# Patient Record
Sex: MALE | Race: WHITE | URBAN - METROPOLITAN AREA
[De-identification: names, ages, dates, MRNs, and addresses within clinical notes are randomized per-mention and may not be internally consistent; named-entity substitution may affect disease eponyms.]

---

## 2017-01-03 ENCOUNTER — OFFICE VISIT (OUTPATIENT)
Dept: DERMATOLOGY | Age: 62
End: 2017-01-03

## 2017-01-03 DIAGNOSIS — D48.5 NEOPLASM OF UNCERTAIN BEHAVIOR OF SKIN: Primary | ICD-10-CM

## 2017-01-03 DIAGNOSIS — L71.9 ROSACEA: ICD-10-CM

## 2017-01-03 DIAGNOSIS — L57.0 AK (ACTINIC KERATOSIS): ICD-10-CM

## 2017-01-03 DIAGNOSIS — Z85.828 HISTORY OF BASAL CELL CANCER: ICD-10-CM

## 2017-01-03 DIAGNOSIS — D22.9 BENIGN NEVUS: ICD-10-CM

## 2017-01-03 DIAGNOSIS — L57.8 DIFFUSE PHOTODAMAGE OF SKIN: ICD-10-CM

## 2017-01-03 PROCEDURE — 99213 OFFICE O/P EST LOW 20 MIN: CPT | Performed by: DERMATOLOGY

## 2017-01-03 PROCEDURE — 17000 DESTRUCT PREMALG LESION: CPT | Performed by: DERMATOLOGY

## 2017-01-03 PROCEDURE — 17003 DESTRUCT PREMALG LES 2-14: CPT | Performed by: DERMATOLOGY

## 2017-01-03 RX ORDER — FLUOROURACIL 50 MG/G
CREAM TOPICAL
Qty: 40 G | Refills: 1 | Status: SHIPPED | OUTPATIENT
Start: 2017-01-03 | End: 2018-08-28 | Stop reason: SDUPTHER

## 2017-01-16 ENCOUNTER — TELEPHONE (OUTPATIENT)
Dept: DERMATOLOGY | Age: 62
End: 2017-01-16

## 2018-01-02 ENCOUNTER — OFFICE VISIT (OUTPATIENT)
Dept: DERMATOLOGY | Age: 63
End: 2018-01-02

## 2018-01-02 DIAGNOSIS — L82.1 SEBORRHEIC KERATOSES: ICD-10-CM

## 2018-01-02 DIAGNOSIS — D22.9 BENIGN NEVUS: ICD-10-CM

## 2018-01-02 DIAGNOSIS — L57.0 AK (ACTINIC KERATOSIS): ICD-10-CM

## 2018-01-02 DIAGNOSIS — D48.5 NEOPLASM OF UNCERTAIN BEHAVIOR OF SKIN: Primary | ICD-10-CM

## 2018-01-02 DIAGNOSIS — Z85.828 HISTORY OF BASAL CELL CANCER: ICD-10-CM

## 2018-01-02 PROCEDURE — 11101 PR BIOPSY, EACH ADDED LESION: CPT | Performed by: DERMATOLOGY

## 2018-01-02 PROCEDURE — 17000 DESTRUCT PREMALG LESION: CPT | Performed by: DERMATOLOGY

## 2018-01-02 PROCEDURE — 99214 OFFICE O/P EST MOD 30 MIN: CPT | Performed by: DERMATOLOGY

## 2018-01-02 PROCEDURE — 11100 PR BIOPSY OF SKIN LESION: CPT | Performed by: DERMATOLOGY

## 2018-01-02 PROCEDURE — 17003 DESTRUCT PREMALG LES 2-14: CPT | Performed by: DERMATOLOGY

## 2018-01-02 NOTE — PROGRESS NOTES
[facility]) Dermatology  Axel Corado M.D.  [phone]       Peterson Quiles  1955    63 y.o. male     Date of Visit: 1/2/2018    Chief Complaint:   Chief Complaint   Patient presents with    Follow-up     Yearly skin exam (upper body only per patient), no new concerns. Hx NMSC,AK's    Actinic Keratosis     Patient did not complete Efudex therapy, he would like areas rechecked first        I was asked to see this patient by Dr. Garcia ref. provider found. History of Present Illness:  1. Total body skin exam    Multiple actinic keratoses and hypertrophic actinic keratoses dorsal hands and forearms. Patient has not yet completed Efudex. New raised scaly papule left mid scalp. Dry. Pruritic. Patient is scratching at lesion. Multiple nevi. Stable in size, shape, color. Asymptomatic. Patient is wearing hats and sunscreen  Skin history:  2/14, 1/16 Efudex to scalp, arms, lips treated sequentially  3/15 right mid paraspinal back superficial basal cell carcinoma status post curettage  Rosacea-clindamycin per primary care doctor    Review of Systems:  Constitutional: Reports general sense of well-being       Past Medical History, Surgical History, Family History, Medications and Allergies reviewed. Social History:   Social History     Social History    Marital status:      Spouse name: N/A    Number of children: N/A    Years of education: N/A     Occupational History    Not on file. Social History Main Topics    Smoking status: Never Smoker    Smokeless tobacco: Never Used    Alcohol use No    Drug use: Unknown    Sexual activity: Not on file     Other Topics Concern    Not on file     Social History Narrative    No narrative on file       Physical Examination       -General: Well-appearing, NAD  Normal affect.   Total body skin exam including scalp, face, neck, chest, abdomen, back, bilateral upper extremities, bilateral lower extremities, ocular conjunctiva, external lips, and nails was performed. Underwear area not examined. Scattered on the trunk and extremities are multiple well-defined round and oval symmetric smoothly-bordered uniformly brown macules and papules. Multiple gritty erythematous papules over his scalp, forehead, dorsal hands, dorsal forearms  Multiple hyperkeratotic stuck on papules and plaques over his torso  Well-healed scar at site of prior basal cell carcinoma no sign of recurrence            6 mm well-demarcated dark brown papule left antecubital fossa, rule out atypia  Left mid scalp 1.2 cm keratotic plaque rule out squamous cell carcinoma      Assessment and Plan     1. Neoplasm of uncertain behavior of skin -Left antecubital fossa and left mid scalp--Discussed possible diagnosis. Patient agreeable to biopsy. Verbal consent obtained after risks (infection, bleeding, scar), benefits and alternatives explained. -Area(s) to be biopsied were marked with a surgical pen. Site(s) were cleansed with alcohol. Local anesthesia achieved with 1% lidocaine with epinephrine/sodium bicarbonate. Shave biopsy performed with a razor blade. Hemostasis was achieved with aluminum chloride. The wound(s) were dressed with petrolatum and covered with a bandage. Specimen(s) sent to pathology. Pt educated re: risk of bleeding, infection, scar and wound care instructions. 2. AK (actinic keratosis) - Scalp, dorsal forearms, dorsal hands-9 lesion(s) treated w/ liquid nitrogen. Edu re: risk of blister formation, discomfort, scar, hypopigmentation. Discussed wound care. Efudex 5% cream b.i.d. ×2.5 weeks dorsal hands and forearms. Reviewed proper use and side effects    3.  Benign nevus- Benign acquired melanocytic nevi  -Recommend monthly self skin exams   -Educated regarding the ABCDEs of melanoma detection   -Call for any new/changing moles or concerning lesions  -Reviewed sun protective behavior -- sun avoidance during the peak hours of the day, sun-protective clothing (including hat and sunglasses), sunscreen use (water resistant, broad spectrum, SPF at least 30, need for reapplication every 2 to 3 hours), avoidance of tanning beds       4. Seborrheic keratoses -Monitor for change    5. History of basal cell cancer - No evidence of recurrence. Discussed risk of subsequent skin cancers and increased risk of melanoma. Reviewed importance of monitoring skin for change and sun protection with hats and sunscreen, as well as sun avoidance.

## 2018-01-10 ENCOUNTER — TELEPHONE (OUTPATIENT)
Dept: DERMATOLOGY | Age: 63
End: 2018-01-10

## 2018-01-10 DIAGNOSIS — C44.42 SQUAMOUS CELL CARCINOMA OF SCALP: Primary | ICD-10-CM

## 2018-01-10 NOTE — TELEPHONE ENCOUNTER
Spoke to pt. Reviewed path results. Referral placed for Dr. Francisca Grossman. Pt stated he understood and will call Dr. Saida Gunderson office to schedule.

## 2018-01-11 ENCOUNTER — TELEPHONE (OUTPATIENT)
Dept: DERMATOLOGY | Age: 63
End: 2018-01-11

## 2018-08-28 ENCOUNTER — OFFICE VISIT (OUTPATIENT)
Dept: DERMATOLOGY | Age: 63
End: 2018-08-28

## 2018-08-28 DIAGNOSIS — Z85.828 HISTORY OF NONMELANOMA SKIN CANCER: ICD-10-CM

## 2018-08-28 DIAGNOSIS — L57.8 DIFFUSE PHOTODAMAGE OF SKIN: ICD-10-CM

## 2018-08-28 DIAGNOSIS — L57.0 AK (ACTINIC KERATOSIS): Primary | ICD-10-CM

## 2018-08-28 DIAGNOSIS — L82.1 SEBORRHEIC KERATOSES: ICD-10-CM

## 2018-08-28 DIAGNOSIS — D22.9 BENIGN NEVUS: ICD-10-CM

## 2018-08-28 PROCEDURE — 17003 DESTRUCT PREMALG LES 2-14: CPT | Performed by: DERMATOLOGY

## 2018-08-28 PROCEDURE — 99213 OFFICE O/P EST LOW 20 MIN: CPT | Performed by: DERMATOLOGY

## 2018-08-28 PROCEDURE — 17000 DESTRUCT PREMALG LESION: CPT | Performed by: DERMATOLOGY

## 2018-08-28 RX ORDER — FLUOROURACIL 50 MG/G
CREAM TOPICAL
Qty: 40 G | Refills: 1 | Status: SHIPPED | OUTPATIENT
Start: 2018-08-28 | End: 2020-02-04 | Stop reason: ALTCHOICE

## 2018-08-28 NOTE — PROGRESS NOTES
recurrence  Multiple gritty erythematous papules including multiple hypertrophic papules and multiple excoriated prurigo nodules dorsal hands, forearms. Multiple hyperkeratotic stuck on papules and plaques over his torso. Prominent photodamage scalp, neck, dorsal hands, dorsal forearms. Assessment and Plan     1. AK (actinic keratosis) - 8-dorsal hands, forearms, scalp lesion(s) treated w/ liquid nitrogen. Edu re: risk of blister formation, discomfort, scar, hypopigmentation. Discussed wound care. Diffuse background actinic change-Efudex 5% cream b.i.d. ×3 weeks dorsal hands, forearms. After he has healed, Efudex b.i.d. ×2 weeks scalp. Reviewed side effects, contraindications, proper application. Discussed importance of sun avoidance. 2. Benign nevus - Benign acquired melanocytic nevi  -Recommend monthly self skin exams   -Educated regarding the ABCDEs of melanoma detection   -Call for any new/changing moles or concerning lesions  -Reviewed sun protective behavior -- sun avoidance during the peak hours of the day, sun-protective clothing (including hat and sunglasses), sunscreen use (water resistant, broad spectrum, SPF at least 30, need for reapplication every 2 to 3 hours), avoidance of tanning beds      3. Seborrheic keratoses -Monitor for change    4. Diffuse photodamage of skin -Hats, sunscreen, sun avoidance    5. History of nonmelanoma skin cancer - No evidence of recurrence. Discussed risk of subsequent skin cancers and increased risk of melanoma. Reviewed importance of monitoring skin for change and sun protection with hats and sunscreen, as well as sun avoidance.        Follow-up 2/19

## 2018-08-29 ENCOUNTER — HOSPITAL ENCOUNTER (OUTPATIENT)
Age: 63
End: 2018-08-29
Payer: COMMERCIAL

## 2018-08-29 DIAGNOSIS — R42: Primary | ICD-10-CM

## 2018-08-29 PROCEDURE — 93880 EXTRACRANIAL BILAT STUDY: CPT

## 2019-01-15 ENCOUNTER — OFFICE VISIT (OUTPATIENT)
Dept: DERMATOLOGY | Age: 64
End: 2019-01-15
Payer: COMMERCIAL

## 2019-01-15 DIAGNOSIS — D22.9 BENIGN NEVUS: ICD-10-CM

## 2019-01-15 DIAGNOSIS — Z85.828 HISTORY OF NONMELANOMA SKIN CANCER: ICD-10-CM

## 2019-01-15 DIAGNOSIS — L57.0 AK (ACTINIC KERATOSIS): Primary | ICD-10-CM

## 2019-01-15 PROCEDURE — 17000 DESTRUCT PREMALG LESION: CPT | Performed by: DERMATOLOGY

## 2019-01-15 PROCEDURE — 17003 DESTRUCT PREMALG LES 2-14: CPT | Performed by: DERMATOLOGY

## 2019-01-15 PROCEDURE — 99213 OFFICE O/P EST LOW 20 MIN: CPT | Performed by: DERMATOLOGY

## 2020-02-04 ENCOUNTER — OFFICE VISIT (OUTPATIENT)
Dept: DERMATOLOGY | Age: 65
End: 2020-02-04
Payer: COMMERCIAL

## 2020-02-04 PROCEDURE — 99213 OFFICE O/P EST LOW 20 MIN: CPT | Performed by: DERMATOLOGY

## 2020-02-04 PROCEDURE — 17000 DESTRUCT PREMALG LESION: CPT | Performed by: DERMATOLOGY

## 2020-02-04 RX ORDER — FLUOROURACIL 50 MG/G
CREAM TOPICAL
Qty: 40 G | Refills: 1 | Status: SHIPPED | OUTPATIENT
Start: 2020-02-04 | End: 2020-07-28

## 2020-02-04 NOTE — PROGRESS NOTES
Inability: Not on file    Transportation needs:     Medical: Not on file     Non-medical: Not on file   Tobacco Use    Smoking status: Never Smoker    Smokeless tobacco: Never Used   Substance and Sexual Activity    Alcohol use: No    Drug use: Not on file    Sexual activity: Not on file   Lifestyle    Physical activity:     Days per week: Not on file     Minutes per session: Not on file    Stress: Not on file   Relationships    Social connections:     Talks on phone: Not on file     Gets together: Not on file     Attends Gnosticist service: Not on file     Active member of club or organization: Not on file     Attends meetings of clubs or organizations: Not on file     Relationship status: Not on file    Intimate partner violence:     Fear of current or ex partner: Not on file     Emotionally abused: Not on file     Physically abused: Not on file     Forced sexual activity: Not on file   Other Topics Concern    Not on file   Social History Narrative    Not on file       Physical Examination       -General: Well-appearing, NAD  1. Normal affect. Total body skin exam including scalp, face, neck, chest, abdomen, back, bilateral upper extremities, bilateral lower extremities-anterior lower legs between knees and socks only, ocular conjunctiva, external lips, and nails was performed. Examination normal unless stated below. Underwear area not examined. Scattered on the trunk and extremities are multiple well-defined round and oval symmetric smoothly-bordered uniformly brown macules and papules. Multiple gritty erythematous papules scalp, face including forehead, temples, cheeks, dorsal hands, dorsal forearms. Diffuse background photodamage with freckling and lentigines  Multiple well-healed scars no sign of recurrence      Assessment and Plan     1. AK (actinic keratosis) -right lower eyelid-1 lesion(s) treated w/ liquid nitrogen. Edu re: risk of blister formation, discomfort, scar, hypopigmentation.

## 2020-07-28 ENCOUNTER — OFFICE VISIT (OUTPATIENT)
Dept: DERMATOLOGY | Age: 65
End: 2020-07-28
Payer: COMMERCIAL

## 2020-07-28 VITALS — TEMPERATURE: 98 F

## 2020-07-28 PROCEDURE — 99213 OFFICE O/P EST LOW 20 MIN: CPT | Performed by: DERMATOLOGY

## 2020-07-28 NOTE — PROGRESS NOTES
file   Tobacco Use    Smoking status: Never Smoker    Smokeless tobacco: Never Used   Substance and Sexual Activity    Alcohol use: No    Drug use: Not on file    Sexual activity: Not on file   Lifestyle    Physical activity     Days per week: Not on file     Minutes per session: Not on file    Stress: Not on file   Relationships    Social connections     Talks on phone: Not on file     Gets together: Not on file     Attends Shinto service: Not on file     Active member of club or organization: Not on file     Attends meetings of clubs or organizations: Not on file     Relationship status: Not on file    Intimate partner violence     Fear of current or ex partner: Not on file     Emotionally abused: Not on file     Physically abused: Not on file     Forced sexual activity: Not on file   Other Topics Concern    Not on file   Social History Narrative    Not on file       Physical Examination       -General: Well-appearing, NAD  1. Normal affect. Total body skin exam including scalp, face, neck, chest, abdomen, back, bilateral upper extremities, bilateral lower extremities between knees and socks, ocular conjunctiva, external lips, and nails was performed. Examination normal unless stated below. Underwear area not examined. Scattered on the trunk and extremities are multiple well-defined round and oval symmetric smoothly-bordered uniformly brown macules and papules. Multiple gritty erythematous papules and plaques scalp, lateral face, posterior neck, ears, dorsal hands and forearms  Minimal seborrheic dermatitis today    Assessment and Plan     1. AK (actinic keratosis)-Efudex 5% cream twice daily for 2 weeks scalp, lateral face, posterior neck, ears, dorsal hands and forearms. Patient will wait until he is wearing long sleeves to treat. Reviewed proper application and side effects-discussed strict sun avoidance    States that he has enough medication at home to treat   2.  Benign nevus - Benign acquired melanocytic nevi  -Recommend monthly self skin exams   -Educated regarding the ABCDEs of melanoma detection   -Call for any new/changing moles or concerning lesions  -Reviewed sun protective behavior -- sun avoidance during the peak hours of the day, sun-protective clothing (including hat and sunglasses), sunscreen use (water resistant, broad spectrum, SPF at least 30, need for reapplication every 2 to 3 hours), avoidance of tanning beds      3. Seborrheic dermatitis-patient will let me know when he needs a refill and call with name of shampoo   4. History of nonmelanoma skin cancer - No evidence of recurrence. Discussed risk of subsequent skin cancers and increased risk of melanoma. Reviewed importance of monitoring skin for change and sun protection with hats and sunscreen, as well as sun avoidance.

## 2020-11-17 ENCOUNTER — HOSPITAL ENCOUNTER (OUTPATIENT)
Age: 65
End: 2020-11-17
Payer: COMMERCIAL

## 2020-11-17 DIAGNOSIS — U07.1: ICD-10-CM

## 2020-11-17 DIAGNOSIS — Z20.828: Primary | ICD-10-CM

## 2020-11-17 PROCEDURE — U0003 INFECTIOUS AGENT DETECTION BY NUCLEIC ACID (DNA OR RNA); SEVERE ACUTE RESPIRATORY SYNDROME CORONAVIRUS 2 (SARS-COV-2) (CORONAVIRUS DISEASE [COVID-19]), AMPLIFIED PROBE TECHNIQUE, MAKING USE OF HIGH THROUGHPUT TECHNOLOGIES AS DESCRIBED BY CMS-2020-01-R: HCPCS

## 2021-01-12 ENCOUNTER — OFFICE VISIT (OUTPATIENT)
Dept: DERMATOLOGY | Age: 66
End: 2021-01-12
Payer: COMMERCIAL

## 2021-01-12 VITALS — TEMPERATURE: 97.7 F

## 2021-01-12 DIAGNOSIS — L57.0 KERATOSIS, ACTINIC: Primary | ICD-10-CM

## 2021-01-12 DIAGNOSIS — D22.9 BENIGN NEVUS: ICD-10-CM

## 2021-01-12 DIAGNOSIS — Z85.828 HISTORY OF NONMELANOMA SKIN CANCER: ICD-10-CM

## 2021-01-12 PROCEDURE — 99214 OFFICE O/P EST MOD 30 MIN: CPT | Performed by: DERMATOLOGY

## 2021-01-12 RX ORDER — FLUOROURACIL 50 MG/G
CREAM TOPICAL
Qty: 40 G | Refills: 1 | Status: SHIPPED | OUTPATIENT
Start: 2021-01-12

## 2021-01-12 NOTE — PROGRESS NOTES
[facility]) Dermatology  Stevo Lieberman M.D.  [phone]       Harvey Castro  1955    63 y.o. male     Date of Visit: 1/12/2021    Chief Complaint:   Chief Complaint   Patient presents with    Skin Lesion     upper body only        I was asked to see this patient by Dr. Garcia ref. provider found. History of Present Illness:  1. Waist up skin exam    Increasing number of actinic keratoses scalp, lateral face, dorsal hands and forearms. Multiple hypertrophic papules and plaques. Had initially planned for Efudex during Covid shutdown-then deferred until fall 2021 patient developed Covid. Patient would like to review treatment and plans to proceed with treatment in the next few weeks. Multiple nevi. Stable in size, shape, color. Has not noticed any new or changing pigmented lesions. Does monitor his skin for change. Skin history:  2/14, 1/16 Efudex to scalp, arms, lips treated sequentially     3/15 right mid paraspinal back superficial basal cell carcinoma status post curettage  1/18 left mid scalp squamous cell carcinoma status post Mohs  1/18 Efudex cream dorsal hands, forearms including biopsy-proven pigmented actinic keratosis left antecubital fossa  12/18 Efudex dorsal hands, forearms  1/19 Efudex face/ scalp     Rosacea-clindamycin per primary care doctor  Review of Systems:  Constitutional: Reports general sense of well-being       Past Medical History, Surgical History, Family History, Medications and Allergies reviewed.     Social History:   Social History     Socioeconomic History    Marital status:      Spouse name: Not on file    Number of children: Not on file    Years of education: Not on file    Highest education level: Not on file   Occupational History    Not on file   Social Needs    Financial resource strain: Not on file    Food insecurity     Worry: Not on file     Inability: Not on file    Transportation needs     Medical: Not on file     Non-medical: Not on file Tobacco Use    Smoking status: Never Smoker    Smokeless tobacco: Never Used   Substance and Sexual Activity    Alcohol use: No    Drug use: Not on file    Sexual activity: Not on file   Lifestyle    Physical activity     Days per week: Not on file     Minutes per session: Not on file    Stress: Not on file   Relationships    Social connections     Talks on phone: Not on file     Gets together: Not on file     Attends Protestant service: Not on file     Active member of club or organization: Not on file     Attends meetings of clubs or organizations: Not on file     Relationship status: Not on file    Intimate partner violence     Fear of current or ex partner: Not on file     Emotionally abused: Not on file     Physically abused: Not on file     Forced sexual activity: Not on file   Other Topics Concern    Not on file   Social History Narrative    Not on file       Physical Examination       -General: Well-appearing, NAD  1. Normal affect. Total body skin exam including scalp, face, neck, chest, abdomen, back, bilateral upper extremities, bilateral lower extremities, ocular conjunctiva, external lips, and nails was performed. Examination normal unless stated below. Underwear area not examined. Scattered on the trunk and extremities are multiple well-defined round and oval symmetric smoothly-bordered uniformly brown macules and papules. Widespread gritty erythematous papules and plaques scalp, forehead, temples, cheeks, upper neck, dorsal hands, dorsal forearms  Well-healed scars no sign of recurrence      Assessment and Plan     1. Keratosis, actinic Efudex 5% cream twice daily for 2 weeks scalp, forehead, temples, cheeks, upper neck, top of ears. After completing 2 weeks-Efudex 5% cream twice daily for 2.5 weeks to dorsal hands, forearms and 3 weeks to more hypertrophic papules and plaques. Reviewed side effects, contraindications, proper application.   Discussed strict sun avoidance-patient already aware   2. Benign nevus - Benign acquired melanocytic nevi  -Recommend monthly self skin exams   -Educated regarding the ABCDEs of melanoma detection   -Call for any new/changing moles or concerning lesions  -Reviewed sun protective behavior -- sun avoidance during the peak hours of the day, sun-protective clothing (including hat and sunglasses), sunscreen use (water resistant, broad spectrum, SPF at least 30, need for reapplication every 2 to 3 hours), avoidance of tanning beds      3. History of nonmelanoma skin cancer - No evidence of recurrence. Discussed risk of subsequent skin cancers and increased risk of melanoma. Reviewed importance of monitoring skin for change and sun protection with hats and sunscreen, as well as sun avoidance.        Recheck 5 to 6 weeks

## 2021-02-25 ENCOUNTER — OFFICE VISIT (OUTPATIENT)
Dept: DERMATOLOGY | Age: 66
End: 2021-02-25
Payer: COMMERCIAL

## 2021-02-25 VITALS — TEMPERATURE: 97.3 F

## 2021-02-25 DIAGNOSIS — L57.0 AK (ACTINIC KERATOSIS): ICD-10-CM

## 2021-02-25 DIAGNOSIS — L57.0 ACTINIC KERATOSIS TREATED WITH TOPICAL FLUOROURACIL (5FU): Primary | ICD-10-CM

## 2021-02-25 PROCEDURE — 17003 DESTRUCT PREMALG LES 2-14: CPT | Performed by: DERMATOLOGY

## 2021-02-25 PROCEDURE — 17000 DESTRUCT PREMALG LESION: CPT | Performed by: DERMATOLOGY

## 2021-02-25 PROCEDURE — 99214 OFFICE O/P EST MOD 30 MIN: CPT | Performed by: DERMATOLOGY

## 2021-02-25 NOTE — PROGRESS NOTES
[facility]) Dermatology  Sierra Jordan M.D.  [phone]       Aleshia Ragland  1955    63 y.o. male     Date of Visit: 2/25/2021    Chief Complaint:   Chief Complaint   Patient presents with    Skin Lesion     Efudex f/u        I was asked to see this patient by Dr. Garcia ref. provider found. History of Present Illness:  1. Patient presents today for follow-up after Efudex    Use Efudex 5% cream twice daily for 2 weeks to his scalp, lateral face, ears and twice daily for 2.5 weeks to his dorsal hands and forearms. A few more hypertrophic papules improved but did not fully resolve. Did have superficial desquamation and resolution of back round actinic damage. No difficulty with infection or sunburn    Skin history:  2/14, 1/16 Efudex to scalp, arms, lips treated sequentially     3/15 right mid paraspinal back superficial basal cell carcinoma status post curettage  1/18 left mid scalp squamous cell carcinoma status post Mohs  1/18 Efudex cream dorsal hands, forearms including biopsy-proven pigmented actinic keratosis left antecubital fossa  12/18 Efudex dorsal hands, forearms  1/19 Efudex face/ scalp    Review of Systems:  Constitutional: Reports general sense of well-being       Past Medical History, Surgical History, Family History, Medications and Allergies reviewed.     Social History:   Social History     Socioeconomic History    Marital status:      Spouse name: Not on file    Number of children: Not on file    Years of education: Not on file    Highest education level: Not on file   Occupational History    Not on file   Social Needs    Financial resource strain: Not on file    Food insecurity     Worry: Not on file     Inability: Not on file    Transportation needs     Medical: Not on file     Non-medical: Not on file   Tobacco Use    Smoking status: Never Smoker    Smokeless tobacco: Never Used   Substance and Sexual Activity    Alcohol use: No    Drug use: Not on file    Sexual activity: Not on file   Lifestyle    Physical activity     Days per week: Not on file     Minutes per session: Not on file    Stress: Not on file   Relationships    Social connections     Talks on phone: Not on file     Gets together: Not on file     Attends Roman Catholic service: Not on file     Active member of club or organization: Not on file     Attends meetings of clubs or organizations: Not on file     Relationship status: Not on file    Intimate partner violence     Fear of current or ex partner: Not on file     Emotionally abused: Not on file     Physically abused: Not on file     Forced sexual activity: Not on file   Other Topics Concern    Not on file   Social History Narrative    Not on file       Physical Examination       -General: Well-appearing, NAD  1. Well-developed well-nourished  Background mild erythema dorsal forearms, lateral face, scalp  More hypertrophic papules thinner and flatter but still present dorsal hands, forearms, ears      Assessment and Plan     1. Actinic keratosis treated with topical fluorouracil (5FU)-discussed hats, sunscreen, sun avoidance. Anticipate repeat courses of Efudex in the future-yearly. 2. AK (actinic keratosis) - 11-ears, dorsal hands, forearms lesion(s) treated w/ liquid nitrogen. Edu re: risk of blister formation, discomfort, scar, hypopigmentation. Discussed wound care.

## 2021-06-08 ENCOUNTER — TELEPHONE (OUTPATIENT)
Dept: DERMATOLOGY | Age: 66
End: 2021-06-08

## 2021-06-08 NOTE — TELEPHONE ENCOUNTER
Unable to return call to patient to get the name of the shampoo [phone] number states number has been changed to [phone], when that number was called message states \" call can't be completed as dialed\".

## 2021-07-13 ENCOUNTER — OFFICE VISIT (OUTPATIENT)
Dept: DERMATOLOGY | Age: 66
End: 2021-07-13
Payer: COMMERCIAL

## 2021-07-13 VITALS — TEMPERATURE: 98.6 F

## 2021-07-13 DIAGNOSIS — L21.9 SEBORRHEIC DERMATITIS: ICD-10-CM

## 2021-07-13 DIAGNOSIS — D22.9 BENIGN NEVUS: ICD-10-CM

## 2021-07-13 DIAGNOSIS — L57.0 ACTINIC KERATOSIS TREATED WITH TOPICAL FLUOROURACIL (5FU): Primary | ICD-10-CM

## 2021-07-13 DIAGNOSIS — D48.5 NEOPLASM OF UNCERTAIN BEHAVIOR OF SKIN: ICD-10-CM

## 2021-07-13 DIAGNOSIS — Z85.828 HISTORY OF NONMELANOMA SKIN CANCER: ICD-10-CM

## 2021-07-13 DIAGNOSIS — L57.0 AK (ACTINIC KERATOSIS): ICD-10-CM

## 2021-07-13 PROCEDURE — 11102 TANGNTL BX SKIN SINGLE LES: CPT | Performed by: DERMATOLOGY

## 2021-07-13 PROCEDURE — 17000 DESTRUCT PREMALG LESION: CPT | Performed by: DERMATOLOGY

## 2021-07-13 PROCEDURE — 99214 OFFICE O/P EST MOD 30 MIN: CPT | Performed by: DERMATOLOGY

## 2021-07-13 PROCEDURE — 17003 DESTRUCT PREMALG LES 2-14: CPT | Performed by: DERMATOLOGY

## 2021-07-13 RX ORDER — KETOCONAZOLE 20 MG/ML
SHAMPOO TOPICAL
Qty: 120 ML | Refills: 6 | Status: SHIPPED | OUTPATIENT
Start: 2021-07-13

## 2021-07-13 NOTE — PROGRESS NOTES
Smoker    Smokeless tobacco: Never Used   Vaping Use    Vaping Use: Never used   Substance and Sexual Activity    Alcohol use: No    Drug use: Not on file    Sexual activity: Not on file   Other Topics Concern    Not on file   Social History Narrative    Not on file     Social Determinants of Health     Financial Resource Strain:     Difficulty of Paying Living Expenses:    Food Insecurity:     Worried About Running Out of Food in the Last Year:     [address] in the Last Year:    Transportation Needs:     Lack of Transportation (Medical):  Lack of Transportation (Non-Medical):    Physical Activity:     Days of Exercise per Week:     Minutes of Exercise per Session:    Stress:     Feeling of Stress :    Social Connections:     Frequency of Communication with Friends and Family:     Frequency of Social Gatherings with Friends and Family:     Attends Islam Services:     Active Member of Clubs or Organizations:     Attends Club or Organization Meetings:     Marital Status:    Intimate Partner Violence:     Fear of Current or Ex-Partner:     Emotionally Abused:     Physically Abused:     Sexually Abused:        Physical Examination       -General: Well-appearing, NAD  Normal affect. Total body skin exam including scalp, face, neck, chest, abdomen, back, bilateral upper extremities, bilateral lower extremities, ocular conjunctiva, external lips, and nails was performed. Examination normal unless stated below. Underwear area not examined. Scattered on the trunk and extremities are multiple well-defined round and oval symmetric smoothly-bordered uniformly brown macules and papules. Background gritty erythematous papules forehead, preauricular cheeks, scalp, dorsal forearms.   More discrete hypertrophic papules dorsal forearms  Mild background scale throughout his scalp, eyebrows, nasofacial groove  Well-healed scars no sign of recurrence    Left medial supra brow 1.1 cm erythematous plaque-rule out superficial basal cell carcinoma      Assessment and Plan     1. .  Actinic keratosis treated with Efudex-Efudex 5% cream twice daily for 2 weeks scalp, forehead, temples, cheeks, ears, upper neck, dorsal hands and forearms. Reviewed side effects, contraindications, proper application. Patient will treat this winter when he can be completely out of the sun   2. AK (actinic keratosis) -7 dorsal forearms- lesion(s) treated w/ liquid nitrogen. Edu re: risk of blister formation, discomfort, scar, hypopigmentation. Discussed wound care. 3. Neoplasm of uncertain behavior of skin-left medial supra brow--Discussed possible diagnosis. Patient agreeable to biopsy. Verbal consent obtained after risks (infection, bleeding, scar), benefits and alternatives explained. -Area(s) to be biopsied were marked with a surgical pen. Site(s) were cleansed with alcohol. Local anesthesia achieved with 1% lidocaine with epinephrine/sodium bicarbonate. Shave biopsy performed with a razor blade. Hemostasis was achieved with aluminum chloride. The wound(s) were dressed with petrolatum and covered with a bandage. Specimen(s) sent to pathology. Pt educated re: risk of bleeding, infection, scar and wound care instructions. 4. Benign nevus - Benign acquired melanocytic nevi  -Recommend monthly self skin exams   -Educated regarding the ABCDEs of melanoma detection   -Call for any new/changing moles or concerning lesions  -Reviewed sun protective behavior -- sun avoidance during the peak hours of the day, sun-protective clothing (including hat and sunglasses), sunscreen use (water resistant, broad spectrum, SPF at least 30, need for reapplication every 2 to 3 hours), avoidance of tanning beds      5. Seborrheic dermatitis-ketoconazole 2% shampoo-discussed proper use, discussed washing both his face and scalp. 6. History of nonmelanoma skin cancer - No evidence of recurrence.  Discussed risk of subsequent skin cancers and

## 2021-07-15 LAB — DERMATOLOGY PATHOLOGY REPORT: NORMAL

## 2022-04-30 ENCOUNTER — HOSPITAL ENCOUNTER (EMERGENCY)
Age: 67
Discharge: HOME | End: 2022-04-30
Payer: COMMERCIAL

## 2022-04-30 VITALS
TEMPERATURE: 98.7 F | SYSTOLIC BLOOD PRESSURE: 141 MMHG | RESPIRATION RATE: 19 BRPM | DIASTOLIC BLOOD PRESSURE: 87 MMHG | OXYGEN SATURATION: 98 % | HEART RATE: 67 BPM

## 2022-04-30 VITALS
RESPIRATION RATE: 19 BRPM | OXYGEN SATURATION: 98 % | TEMPERATURE: 98.78 F | SYSTOLIC BLOOD PRESSURE: 141 MMHG | DIASTOLIC BLOOD PRESSURE: 87 MMHG | HEART RATE: 67 BPM

## 2022-04-30 VITALS — BODY MASS INDEX: 31.3 KG/M2

## 2022-04-30 DIAGNOSIS — I10: ICD-10-CM

## 2022-04-30 DIAGNOSIS — Z96.643: ICD-10-CM

## 2022-04-30 DIAGNOSIS — Z88.8: ICD-10-CM

## 2022-04-30 DIAGNOSIS — G89.29: ICD-10-CM

## 2022-04-30 DIAGNOSIS — I25.10: ICD-10-CM

## 2022-04-30 DIAGNOSIS — M62.838: ICD-10-CM

## 2022-04-30 DIAGNOSIS — Z79.899: ICD-10-CM

## 2022-04-30 DIAGNOSIS — M54.50: Primary | ICD-10-CM

## 2022-04-30 DIAGNOSIS — E11.9: ICD-10-CM

## 2022-04-30 DIAGNOSIS — Z95.5: ICD-10-CM

## 2022-04-30 DIAGNOSIS — Z79.51: ICD-10-CM

## 2022-04-30 DIAGNOSIS — Z79.84: ICD-10-CM

## 2022-04-30 DIAGNOSIS — E78.5: ICD-10-CM

## 2022-04-30 PROCEDURE — G0463 HOSPITAL OUTPT CLINIC VISIT: HCPCS

## 2022-04-30 PROCEDURE — 99213 OFFICE O/P EST LOW 20 MIN: CPT

## 2022-04-30 PROCEDURE — 96372 THER/PROPH/DIAG INJ SC/IM: CPT

## 2023-03-15 ENCOUNTER — OFFICE VISIT (OUTPATIENT)
Dept: DERMATOLOGY | Age: 68
End: 2023-03-15
Payer: MEDICARE

## 2023-03-15 DIAGNOSIS — D22.9 BENIGN NEVUS: ICD-10-CM

## 2023-03-15 DIAGNOSIS — Z85.828 HISTORY OF NONMELANOMA SKIN CANCER: ICD-10-CM

## 2023-03-15 DIAGNOSIS — D48.5 NEOPLASM OF UNCERTAIN BEHAVIOR OF SKIN: ICD-10-CM

## 2023-03-15 DIAGNOSIS — L57.0 AK (ACTINIC KERATOSIS): Primary | ICD-10-CM

## 2023-03-15 DIAGNOSIS — L21.9 SEBORRHEIC DERMATITIS: ICD-10-CM

## 2023-03-15 DIAGNOSIS — L57.0 ACTINIC KERATOSIS TREATED WITH TOPICAL FLUOROURACIL (5FU): ICD-10-CM

## 2023-03-15 PROCEDURE — 99214 OFFICE O/P EST MOD 30 MIN: CPT | Performed by: DERMATOLOGY

## 2023-03-15 PROCEDURE — G8484 FLU IMMUNIZE NO ADMIN: HCPCS | Performed by: DERMATOLOGY

## 2023-03-15 PROCEDURE — 1036F TOBACCO NON-USER: CPT | Performed by: DERMATOLOGY

## 2023-03-15 PROCEDURE — 17000 DESTRUCT PREMALG LESION: CPT | Performed by: DERMATOLOGY

## 2023-03-15 PROCEDURE — 3017F COLORECTAL CA SCREEN DOC REV: CPT | Performed by: DERMATOLOGY

## 2023-03-15 PROCEDURE — 11102 TANGNTL BX SKIN SINGLE LES: CPT | Performed by: DERMATOLOGY

## 2023-03-15 PROCEDURE — G8421 BMI NOT CALCULATED: HCPCS | Performed by: DERMATOLOGY

## 2023-03-15 PROCEDURE — 1123F ACP DISCUSS/DSCN MKR DOCD: CPT | Performed by: DERMATOLOGY

## 2023-03-15 PROCEDURE — G8427 DOCREV CUR MEDS BY ELIG CLIN: HCPCS | Performed by: DERMATOLOGY

## 2023-03-15 PROCEDURE — 17003 DESTRUCT PREMALG LES 2-14: CPT | Performed by: DERMATOLOGY

## 2023-03-15 RX ORDER — FLUOROURACIL 50 MG/G
CREAM TOPICAL
Qty: 40 G | Refills: 1 | Status: SHIPPED | OUTPATIENT
Start: 2023-03-15

## 2023-03-15 RX ORDER — KETOCONAZOLE 20 MG/ML
SHAMPOO TOPICAL
Qty: 120 ML | Refills: 6 | OUTPATIENT
Start: 2023-03-15

## 2023-03-15 RX ORDER — KETOCONAZOLE 20 MG/ML
SHAMPOO TOPICAL
Qty: 120 ML | Refills: 6 | Status: SHIPPED | OUTPATIENT
Start: 2023-03-15

## 2023-03-15 NOTE — PROGRESS NOTES
[facility]) Dermatology  Darlene Toledo M.D.  [phone]       Kenny Wilson  1955    74 y.o. male     Date of Visit: 3/15/2023    Chief Complaint:   Chief Complaint   Patient presents with    Skin Lesion     Medication refill        I was asked to see this patient by Dr. Garcia ref. provider found. History of Present Illness:  1. Follow-up    Patient has not been seen in almost 2 years-2 shoulder replacements since his last visit    Actinic keratoses-did use Efudex 5% cream twice daily for 1 week face, dorsal forearms, back of his neck-then ran out. Still has a few actinic keratoses forearms, face, back of his neck. No lesion growing rapidly. Erythematous plaque right lateral upper arm. Present for years. Not increasing in size, itching or bleeding. Patient noticed that it developed after a flu shot. Seborrheic dermatitis-uses ketoconazole shampoo. Needs a refill. Does feel that this is helpful          Skin history:  2/14, 1/16 Efudex to scalp, arms, lips treated sequentially     3/15 right mid paraspinal back superficial basal cell carcinoma status post curettage  1/18 left mid scalp squamous cell carcinoma status post Mohs  1/18 Efudex cream dorsal hands, forearms including biopsy-proven pigmented actinic keratosis left antecubital fossa  12/18 Efudex dorsal hands, forearms  1/19 Efudex face/ scalp  2/21 Efudex scalp, lateral face, ears, dorsal hands and forearms  1/23 Efudex face, arms, back of his neck for 1 week-ran out of cream    Review of Systems:  Constitutional: Reports general sense of well-being       Past Medical History, Surgical History, Family History, Medications and Allergies reviewed.     Social History:   Social History     Socioeconomic History    Marital status:      Spouse name: Not on file    Number of children: Not on file    Years of education: Not on file    Highest education level: Not on file   Occupational History    Not on file   Tobacco Use    Smoking status: Never    Smokeless tobacco: Never   Vaping Use    Vaping Use: Never used   Substance and Sexual Activity    Alcohol use: No    Drug use: Not on file    Sexual activity: Not on file   Other Topics Concern    Not on file   Social History Narrative    Not on file     Social Determinants of Health     Financial Resource Strain: Not on file   Food Insecurity: Not on file   Transportation Needs: Not on file   Physical Activity: Not on file   Stress: Not on file   Social Connections: Not on file   Intimate Partner Violence: Not on file   Housing Stability: Not on file       Physical Examination       -General: Well-appearing, NAD  1. Waist up skin exam  Severe photodamage with freckling and lentigines face, neck, forearms  Multiple gritty erythematous papules scalp, face, arms, posterior neck  Mild scaling erythema throughout his scalp-seborrheic dermatitis    Right lateral upper arm 1.2 cm thin pink plaque-BL K, rule out actinic keratosis or basal cell carcinoma        Assessment and Plan     1. AK (actinic keratosis) -9-scalp, face, arms, neck after the risks, complications, and alternatives were explained to the patient, including risk of blister formation, discomfort, scar, hypopigmentation, patient elected to proceed with cryotherapy. Lesion(s) were treated w/ liquid nitrogen using a Cryogun. 1 freeze thaw cycle was performed with a freeze time of 1-5 seconds. There were no immediate complications. Wound care instructions were discussed with the patient. 2. Actinic keratosis treated with topical fluorouracil (5FU)-Efudex 5% cream twice daily for 1 more week posterior neck. Reviewed side effects, contraindications, proper application, strict sun avoidance   3. Neoplasm of uncertain behavior of skin-right lateral upper arm--Discussed possible diagnosis. Patient agreeable to biopsy. Verbal consent obtained after risks (infection, bleeding, scar), benefits and alternatives explained.    -Area(s) to be biopsied were marked with a surgical pen. Site(s) were cleansed with alcohol. Local anesthesia achieved with 1% lidocaine with epinephrine/sodium bicarbonate. Shave biopsy performed with a razor blade. Hemostasis was achieved with aluminum chloride. The wound(s) were dressed with petrolatum and covered with a bandage. Specimen(s) sent to pathology. Pt educated re: risk of bleeding, infection, scar and wound care instructions. 4. Benign nevus - Benign acquired melanocytic nevi  -Recommend monthly self skin exams   -Educated regarding the ABCDEs of melanoma detection   -Call for any new/changing moles or concerning lesions  -Reviewed sun protective behavior -- sun avoidance during the peak hours of the day, sun-protective clothing (including hat and sunglasses), sunscreen use (water resistant, broad spectrum, SPF at least 30, need for reapplication every 2 to 3 hours), avoidance of tanning beds      5. Seborrheic dermatitis-ketoconazole shampoo as needed   6. History of nonmelanoma skin cancer - No evidence of recurrence. Discussed risk of subsequent skin cancers and increased risk of melanoma. Reviewed importance of monitoring skin for change and sun protection with hats and sunscreen, as well as sun avoidance.

## 2023-03-22 LAB — DERMATOLOGY PATHOLOGY REPORT: NORMAL

## 2023-10-11 ENCOUNTER — TELEPHONE (OUTPATIENT)
Dept: GASTROENTEROLOGY | Facility: CLINIC | Age: 68
End: 2023-10-11

## 2023-10-11 NOTE — TELEPHONE ENCOUNTER
Caller: Abel Rodriguez    Relationship to patient: Self    Best call back number: [phone]     Type of visit: COLONOSCOPY     Additional notes: PATIENT HAS A PROCEDURE SCHEDULED WITH DR FAJARDO ON 10/30 THEY NEED TO CANCEL.

## 2024-02-14 ENCOUNTER — OFFICE VISIT (OUTPATIENT)
Dept: DERMATOLOGY | Age: 69
End: 2024-02-14

## 2024-02-14 DIAGNOSIS — D48.5 NEOPLASM OF UNCERTAIN BEHAVIOR OF SKIN: Primary | ICD-10-CM

## 2024-02-14 DIAGNOSIS — L57.0 ACTINIC KERATOSIS TREATED WITH TOPICAL FLUOROURACIL (5FU): ICD-10-CM

## 2024-02-14 DIAGNOSIS — Z85.828 HISTORY OF NONMELANOMA SKIN CANCER: ICD-10-CM

## 2024-02-14 DIAGNOSIS — D22.9 BENIGN NEVUS: ICD-10-CM

## 2024-02-14 DIAGNOSIS — L57.0 AK (ACTINIC KERATOSIS): ICD-10-CM

## 2024-02-14 RX ORDER — CALCIPOTRIENE 50 UG/G
CREAM TOPICAL
Qty: 60 G | Refills: 0 | Status: SHIPPED | OUTPATIENT
Start: 2024-02-14

## 2024-02-14 NOTE — PROGRESS NOTES
[facility] Dermatology  Briseida Bell M.D.  [phone]       Arsenio Roy  1955    68 y.o. male     Date of Visit: 2/14/2024    Chief Complaint:   Chief Complaint   Patient presents with    Skin Lesion        I was asked to see this patient by Dr. Garcia ref. provider found.    History of Present Illness:  1.  Urgent visit and waist up skin exam    Eroded plaque left shoulder-present about a month.  Nontender.  Became crusted and has not healed.    Increasing number of actinic keratoses face, arms-last treated with fluorouracil 1/23.    Multiple nevi. Patient has not noticed any new or changing pigmented lesions.   Stable in size, shape, color. Not itching, bleeding.     Skin history:  2/14, 1/16 Efudex to scalp, arms, lips treated sequentially     3/15 right mid paraspinal back superficial basal cell carcinoma status post curettage  1/18 left mid scalp squamous cell carcinoma status post Mohs  1/18 Efudex cream dorsal hands, forearms including biopsy-proven pigmented actinic keratosis left antecubital fossa  12/18 Efudex dorsal hands, forearms  1/19 Efudex face/ scalp  2/21 Efudex scalp, lateral face, ears, dorsal hands and forearms  1/23 Efudex face, arms, back of his neck for 1 week-ran out of cream    Review of Systems:  Constitutional: Reports general sense of well-being       Past Medical History, Surgical History, Family History, Medications and Allergies reviewed.    Social History:   Social History     Socioeconomic History    Marital status:      Spouse name: Not on file    Number of children: Not on file    Years of education: Not on file    Highest education level: Not on file   Occupational History    Not on file   Tobacco Use    Smoking status: Never    Smokeless tobacco: Never   Vaping Use    Vaping Use: Never used   Substance and Sexual Activity    Alcohol use: No    Drug use: Not on file    Sexual activity: Not on file   Other Topics Concern    Not on file   Social History Narrative

## 2024-02-15 ENCOUNTER — TELEPHONE (OUTPATIENT)
Dept: DERMATOLOGY | Age: 69
End: 2024-02-15

## 2024-02-20 NOTE — RESULT ENCOUNTER NOTE
Please notify patient.  No further therapy left lateral shoulder.  Right dorsal hand treated with curettage at time of biopsy-patient should let me know if this recurs.

## 2024-02-27 ENCOUNTER — TELEPHONE (OUTPATIENT)
Dept: DERMATOLOGY | Age: 69
End: 2024-02-27

## 2024-02-27 NOTE — TELEPHONE ENCOUNTER
Pt calling states he needs a PA on medication Calcipotriene 0.005% cream pls return call back @ [phone] to discuss

## 2024-02-28 NOTE — TELEPHONE ENCOUNTER
Submitted PA for Calcipotriene  Via [facility]: [id number]  STATUS: PENDING.    Follow up done daily; if no decision with in three days we will refax.  If another three days goes by with no decision will call the insurance for status.

## 2024-02-28 NOTE — TELEPHONE ENCOUNTER
Approved today  PA Case: [id number], Status: Approved, Coverage Starts on: 1/1/2024 12:00:00 AM, Coverage Ends on: 12/31/2024

## 2024-02-29 NOTE — TELEPHONE ENCOUNTER
Called and spoke to Abby at [facility] pharmacy informed her of PA approval for Calcipotriene she stated they would inform the patient.

## 2024-06-07 ENCOUNTER — HOSPITAL ENCOUNTER (EMERGENCY)
Age: 69
Discharge: HOME | End: 2024-06-07
Payer: MEDICARE

## 2024-06-07 VITALS
DIASTOLIC BLOOD PRESSURE: 69 MMHG | OXYGEN SATURATION: 97 % | HEART RATE: 63 BPM | RESPIRATION RATE: 20 BRPM | TEMPERATURE: 97.52 F | SYSTOLIC BLOOD PRESSURE: 130 MMHG

## 2024-06-07 VITALS
HEART RATE: 63 BPM | OXYGEN SATURATION: 97 % | TEMPERATURE: 97.52 F | DIASTOLIC BLOOD PRESSURE: 69 MMHG | SYSTOLIC BLOOD PRESSURE: 130 MMHG | RESPIRATION RATE: 20 BRPM

## 2024-06-07 VITALS — BODY MASS INDEX: 36.2 KG/M2

## 2024-06-07 DIAGNOSIS — J01.90: Primary | ICD-10-CM

## 2024-06-07 DIAGNOSIS — R42: ICD-10-CM

## 2024-06-07 DIAGNOSIS — I10: ICD-10-CM

## 2024-06-07 DIAGNOSIS — E78.5: ICD-10-CM

## 2024-06-07 DIAGNOSIS — Z79.84: ICD-10-CM

## 2024-06-07 DIAGNOSIS — E11.9: ICD-10-CM

## 2024-06-07 DIAGNOSIS — M54.2: ICD-10-CM

## 2024-06-07 PROCEDURE — G0463 HOSPITAL OUTPT CLINIC VISIT: HCPCS

## 2024-06-07 PROCEDURE — 99214 OFFICE O/P EST MOD 30 MIN: CPT

## 2024-06-07 PROCEDURE — 99212 OFFICE O/P EST SF 10 MIN: CPT

## 2024-06-07 PROCEDURE — 93005 ELECTROCARDIOGRAM TRACING: CPT

## 2024-10-09 ENCOUNTER — TELEPHONE (OUTPATIENT)
Dept: GASTROENTEROLOGY | Facility: CLINIC | Age: 69
End: 2024-10-09

## 2024-10-09 NOTE — TELEPHONE ENCOUNTER
Caller: ORESTES ACEVEDO    Relationship to patient: SELF    Best call back number: [phone]    Chief complaint: PT WAS JUST PUT ON BLOOD THINNERS FOR HIS HEART AND NEEDS TO BE ON THEM FOR AT LEAST 6 MONTHS. PT WILL CALL BACK WHEN HE'S TOLD IT'S OKAY TO COME OFF THE BLOOD THINNERS FOR HIS PROCEDURE.     Type of visit: COLONOSCOPY     Requested date:     If rescheduling, when is the original appointment: 11.21    Additional notes:

## 2024-11-26 ENCOUNTER — OFFICE VISIT (OUTPATIENT)
Dept: DERMATOLOGY | Age: 69
End: 2024-11-26

## 2024-11-26 DIAGNOSIS — Z85.828 HISTORY OF NONMELANOMA SKIN CANCER: ICD-10-CM

## 2024-11-26 DIAGNOSIS — D22.9 BENIGN NEVUS: ICD-10-CM

## 2024-11-26 DIAGNOSIS — L57.0 ACTINIC KERATOSIS TREATED WITH TOPICAL FLUOROURACIL (5FU): ICD-10-CM

## 2024-11-26 DIAGNOSIS — L57.0 AK (ACTINIC KERATOSIS): Primary | ICD-10-CM

## 2024-11-26 RX ORDER — APIXABAN 5 MG/1
5 TABLET, FILM COATED ORAL 2 TIMES DAILY
COMMUNITY
Start: 2024-10-29

## 2024-11-26 NOTE — PROGRESS NOTES
[facility] Dermatology  Briseida Bell M.D.  [phone]       Arsenio Roy  1955    69 y.o. male     Date of Visit: 11/26/2024    Chief Complaint:   Chief Complaint   Patient presents with    Skin Lesion        I was asked to see this patient by Dr. Garcia ref. provider found.    History of Present Illness:  1.  Waist up    Multiple health problems and family members with health problems.  Has not yet completed fluorouracil.  Increasing number of actinic keratoses periphery of face, scalp, dorsal hands and forearms.  Multiple hypertrophic papules.  None growing rapidly or becoming painful.    Multiple nevi. Patient has not noticed any new or changing pigmented lesions.   Stable in size, shape, color. Not itching, bleeding.         Skin history:  2/14, 1/16 Efudex to scalp, arms, lips treated sequentially     3/15 right mid paraspinal back superficial basal cell carcinoma status post curettage  1/18 left mid scalp squamous cell carcinoma status post Mohs  1/18 Efudex cream dorsal hands, forearms including biopsy-proven pigmented actinic keratosis left antecubital fossa  12/18 Efudex dorsal hands, forearms  1/19 Efudex face/ scalp  2/21 Efudex scalp, lateral face, ears, dorsal hands and forearms  1/23 Efudex face, arms, back of his neck for 1 week-ran out of cream    Review of Systems:  Constitutional: Reports general sense of well-being       Past Medical History, Surgical History, Family History, Medications and Allergies reviewed.    Social History:   Social History     Socioeconomic History    Marital status:      Spouse name: Not on file    Number of children: Not on file    Years of education: Not on file    Highest education level: Not on file   Occupational History    Not on file   Tobacco Use    Smoking status: Never    Smokeless tobacco: Never   Vaping Use    Vaping status: Never Used   Substance and Sexual Activity    Alcohol use: No    Drug use: Not on file    Sexual activity: Not on file

## 2025-02-03 ENCOUNTER — OFFICE VISIT (OUTPATIENT)
Age: 70
End: 2025-02-03

## 2025-02-03 DIAGNOSIS — L57.8 DIFFUSE PHOTODAMAGE OF SKIN: ICD-10-CM

## 2025-02-03 DIAGNOSIS — L72.0 EPIDERMAL CYST: ICD-10-CM

## 2025-02-03 DIAGNOSIS — Z85.828 HISTORY OF NONMELANOMA SKIN CANCER: ICD-10-CM

## 2025-02-03 DIAGNOSIS — L57.0 AK (ACTINIC KERATOSIS): Primary | ICD-10-CM

## 2025-02-03 NOTE — PROGRESS NOTES
[facility] Dermatology  Briseida Bell M.D.  [phone]       Arsenio Roy  1955    69 y.o. male     Date of Visit: 2/3/2025    Chief Complaint:   Chief Complaint   Patient presents with    Skin Lesion        I was asked to see this patient by Dr. Garcia ref. provider found.    History of Present Illness:  1.     History of Present Illness  The patient presents for evaluation of actinic keratosis.    Actinic Keratosis  - The patient reports significant improvement with the application of fluorouracil plus calcipotriene in November and December, twice daily for 5 days face and 7 days forearms.  - He has recently observed the re-emergence of small keratotic lesions, had been relatively clear  - An accidental transfer of the cream to his upper eyelid region during sleep resulted in a burning sensation   - He experienced desquamation of the skin.    Raised papule left base of scalp posteriorly.  Asymptomatic.  Not sure how long it has been present but years.  Not increasing in size or becoming infected    Recent abrasion left dorsal forearm, healed with postinflammatory erythema    Supplemental information: The patient is currently on Eliquis (apixaban) for the management of atrial fibrillation.    MEDICATIONS  Current: Eliquis     Skin history:  2/14, 1/16 Efudex to scalp, arms, lips treated sequentially     3/15 right mid paraspinal back superficial basal cell carcinoma status post curettage  1/18 left mid scalp squamous cell carcinoma status post Mohs  1/18 Efudex cream dorsal hands, forearms including biopsy-proven pigmented actinic keratosis left antecubital fossa  12/18 Efudex dorsal hands, forearms  1/19 Efudex face/ scalp  2/21 Efudex scalp, lateral face, ears, dorsal hands and forearms  1/23 Efudex face, arms, back of his neck for 1 week-ran out of cream  11/24 Efudex plus calcipotriene face 5 days and 7 days arms, vigorous reaction face    Review of Systems:  Constitutional: Reports general sense of